# Patient Record
Sex: FEMALE | URBAN - METROPOLITAN AREA
[De-identification: names, ages, dates, MRNs, and addresses within clinical notes are randomized per-mention and may not be internally consistent; named-entity substitution may affect disease eponyms.]

---

## 2021-06-03 ENCOUNTER — OFFICE VISIT (OUTPATIENT)
Dept: URBAN - METROPOLITAN AREA CLINIC 33 | Facility: CLINIC | Age: 61
End: 2021-06-03
Payer: COMMERCIAL

## 2021-06-03 DIAGNOSIS — H11.31 SUBCONJUNCTIVAL HEMORRHAGE OF RIGHT EYE: Primary | ICD-10-CM

## 2021-06-03 PROCEDURE — 99203 OFFICE O/P NEW LOW 30 MIN: CPT | Performed by: OPTOMETRIST

## 2021-06-03 ASSESSMENT — INTRAOCULAR PRESSURE
OS: 16
OD: 16

## 2021-06-03 ASSESSMENT — KERATOMETRY
OD: 41.88
OS: 42.38

## 2021-06-03 NOTE — IMPRESSION/PLAN
Impression: Subconjunctival hemorrhage of right eye: H11.31.

-History of subconjunctival hemorrhages in OD Plan: Conjunctival hemorrhage is largely a benign finding caused by pooling of blood underneath the conjunctiva from the rupture of small capillary vessels. This is commonly caused by valsalva maneuver, sneezing, coughing, or any form of strenuous activity. Cold compresses and AT's have been recommended to patient for relief of irritation but hemorrhage may take several days or weeks to resolve.

## 2022-11-15 ENCOUNTER — OFFICE VISIT (OUTPATIENT)
Dept: URBAN - METROPOLITAN AREA CLINIC 33 | Facility: CLINIC | Age: 62
End: 2022-11-15
Payer: COMMERCIAL

## 2022-11-15 DIAGNOSIS — E11.9 TYPE 2 DIABETES MELLITUS W/O COMPLICATION: Primary | ICD-10-CM

## 2022-11-15 DIAGNOSIS — H52.4 PRESBYOPIA: ICD-10-CM

## 2022-11-15 PROCEDURE — 99214 OFFICE O/P EST MOD 30 MIN: CPT | Performed by: OPTOMETRIST

## 2022-11-15 ASSESSMENT — INTRAOCULAR PRESSURE
OD: 16
OS: 13

## 2022-11-15 ASSESSMENT — VISUAL ACUITY
OD: 20/20
OS: 20/20

## 2022-11-15 ASSESSMENT — KERATOMETRY
OD: 41.75
OS: 42.38

## 2022-11-15 NOTE — IMPRESSION/PLAN
Impression: Type 2 diabetes mellitus w/o complication: D34.8. Plan: Diabetes w/o Complications: No signs of diabetic retinopathy noted. No treatment necessary at this time.  Return yearly for Diabetic Exam.

## 2024-05-10 ENCOUNTER — OFFICE VISIT (OUTPATIENT)
Dept: URBAN - METROPOLITAN AREA CLINIC 33 | Facility: CLINIC | Age: 64
End: 2024-05-10
Payer: COMMERCIAL

## 2024-05-10 DIAGNOSIS — H25.13 AGE-RELATED NUCLEAR CATARACT, BILATERAL: ICD-10-CM

## 2024-05-10 DIAGNOSIS — E11.9 TYPE 2 DIABETES MELLITUS W/O COMPLICATION: Primary | ICD-10-CM

## 2024-05-10 DIAGNOSIS — H52.4 PRESBYOPIA: ICD-10-CM

## 2024-05-10 PROCEDURE — 99213 OFFICE O/P EST LOW 20 MIN: CPT

## 2024-05-10 ASSESSMENT — VISUAL ACUITY
OS: 20/20
OD: 20/20

## 2024-05-10 ASSESSMENT — INTRAOCULAR PRESSURE
OD: 14
OS: 16